# Patient Record
Sex: FEMALE | Race: WHITE | NOT HISPANIC OR LATINO | URBAN - NONMETROPOLITAN AREA
[De-identification: names, ages, dates, MRNs, and addresses within clinical notes are randomized per-mention and may not be internally consistent; named-entity substitution may affect disease eponyms.]

---

## 2020-03-30 NOTE — PROCEDURE NOTE: SURGICAL
Prior to commencing surgery, patient identification, surgical procedure, site, and side were confirmed by Dr. Roopa Ruiz. Following topical proparacaine anesthesia, the patient was positioned at the YAG laser, a contact lens coupled to the cornea of the right eye with methylcellulose and a peripheral iridotomy placed using 3.1 Mj Y24 without complication. Attention was turned to the left eye and the patient was positioned at the YAG laser, a contact lens coupled to the cornea with methylcellulose and a peripheral iridotomy placed using 3.4 Mj x 12 without complication. Excess methylcellulose was washed from both eyes, one drop of Econopred Plus 1% instilled and the patient returned to the holding area having tolerated the procedure well and without complication. <br />RAMSES:[id number]<KQ /><br />

## 2020-07-01 NOTE — PATIENT DISCUSSION
VISUAL FIELD BASELINE OU. WILL CONTINUE TO MONITOR FOR NOW. IF CHANGES, START TREATMENT. WILL SEE DOCTOR AT [facility] FOR GLASSES.

## 2022-02-28 ENCOUNTER — COMPREHENSIVE EXAM (OUTPATIENT)
Dept: URBAN - NONMETROPOLITAN AREA CLINIC 4 | Facility: CLINIC | Age: 42
End: 2022-02-28

## 2022-02-28 DIAGNOSIS — H52.13: ICD-10-CM

## 2022-02-28 DIAGNOSIS — H52.4: ICD-10-CM

## 2022-02-28 PROCEDURE — 92004 COMPRE OPH EXAM NEW PT 1/>: CPT

## 2022-02-28 PROCEDURE — 92015 DETERMINE REFRACTIVE STATE: CPT

## 2022-02-28 ASSESSMENT — VISUAL ACUITY
OD_SC: 20/20
OU_SC: 20/20
OS_SC: 20/20

## 2022-02-28 ASSESSMENT — TONOMETRY
OS_IOP_MMHG: 15
OD_IOP_MMHG: 15